# Patient Record
Sex: FEMALE | URBAN - METROPOLITAN AREA
[De-identification: names, ages, dates, MRNs, and addresses within clinical notes are randomized per-mention and may not be internally consistent; named-entity substitution may affect disease eponyms.]

---

## 2022-02-22 ENCOUNTER — APPOINTMENT (OUTPATIENT)
Age: 62
Setting detail: DERMATOLOGY
End: 2022-02-24

## 2022-04-25 ENCOUNTER — OFFICE VISIT (OUTPATIENT)
Dept: URBAN - METROPOLITAN AREA CLINIC 34 | Facility: CLINIC | Age: 62
End: 2022-04-25
Payer: MEDICAID

## 2022-04-25 DIAGNOSIS — H02.035 SENILE ENTROPION OF LEFT LOWER EYELID: ICD-10-CM

## 2022-04-25 PROCEDURE — 92285 EXTERNAL OCULAR PHOTOGRAPHY: CPT | Performed by: OPHTHALMOLOGY

## 2022-04-25 PROCEDURE — 99204 OFFICE O/P NEW MOD 45 MIN: CPT | Performed by: OPHTHALMOLOGY

## 2022-04-25 RX ORDER — NEOMYCIN SULFATE, POLYMYXIN B SULFATE AND DEXAMETHASONE 3.5; 10000; 1 MG/G; [USP'U]/G; MG/G
OINTMENT OPHTHALMIC
Qty: 1 | Refills: 1 | Status: ACTIVE
Start: 2022-04-25

## 2022-04-25 RX ORDER — ALLOPURINOL 100 MG/1
100 MG TABLET ORAL
Qty: 0 | Refills: 0 | Status: ACTIVE
Start: 2022-04-25

## 2022-04-25 RX ORDER — METHIMAZOLE 5 MG/1
5 MG TABLET ORAL
Qty: 0 | Refills: 0 | Status: INACTIVE
Start: 2022-04-25 | End: 2022-05-17

## 2022-04-25 RX ORDER — VALSARTAN 80 MG/1
80 MG TABLET, FILM COATED ORAL
Qty: 0 | Refills: 0 | Status: ACTIVE
Start: 2022-04-25

## 2022-04-25 RX ORDER — SAXAGLIPTIN 5 MG/1
5 MG TABLET, FILM COATED ORAL
Qty: 0 | Refills: 0 | Status: ACTIVE
Start: 2022-04-25

## 2022-04-25 NOTE — IMPRESSION/PLAN
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 Plan: The patient demonstrates burton tarsal entropion, with lashes abrading the cornea/globe, creating a mechanical keratoconjunctivitis. To resolve this, I recommended ATR < 10cm , along with a lateral tarsal strip to elevate/tighten/stabilize the lower lid, and prevent recurrence of entropion. The RBA of surgery were discussed and all questions were answered. Will do RLL first then LLL.

## 2022-04-25 NOTE — IMPRESSION/PLAN
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 Plan: The patient demonstrates burton tarsal entropion, with lashes abrading the cornea/globe, creating a mechanical keratoconjunctivitis. To resolve this, I recommended ATR < 10cm , along with a lateral tarsal strip to elevate/tighten/stabilize the lower lid, and prevent recurrence of entropion. The RBA of surgery were discussed and all questions were answered. Will do RLL first then LLL.

## 2022-05-10 ENCOUNTER — APPOINTMENT (OUTPATIENT)
Age: 62
Setting detail: DERMATOLOGY
End: 2022-05-11

## 2022-05-10 DIAGNOSIS — D22 MELANOCYTIC NEVI: ICD-10-CM

## 2022-05-10 DIAGNOSIS — L82.1 OTHER SEBORRHEIC KERATOSIS: ICD-10-CM

## 2022-05-10 DIAGNOSIS — L60.3 NAIL DYSTROPHY: ICD-10-CM

## 2022-05-10 PROBLEM — D22.39 MELANOCYTIC NEVI OF OTHER PARTS OF FACE: Status: ACTIVE | Noted: 2022-05-10

## 2022-05-10 PROCEDURE — 99203 OFFICE O/P NEW LOW 30 MIN: CPT

## 2022-05-10 PROCEDURE — OTHER TREATMENT REGIMEN: OTHER

## 2022-05-10 PROCEDURE — OTHER COUNSELING: OTHER

## 2022-05-10 ASSESSMENT — LOCATION SIMPLE DESCRIPTION DERM
LOCATION SIMPLE: LEFT CHEEK
LOCATION SIMPLE: NOSE
LOCATION SIMPLE: RIGHT GREAT TOE

## 2022-05-10 ASSESSMENT — LOCATION ZONE DERM
LOCATION ZONE: NOSE
LOCATION ZONE: TOENAIL
LOCATION ZONE: FACE

## 2022-05-10 ASSESSMENT — LOCATION DETAILED DESCRIPTION DERM
LOCATION DETAILED: RIGHT GREAT TOENAIL
LOCATION DETAILED: LEFT NASAL DORSUM
LOCATION DETAILED: LEFT CENTRAL MALAR CHEEK

## 2022-05-17 ENCOUNTER — ADULT PHYSICAL (OUTPATIENT)
Dept: URBAN - METROPOLITAN AREA CLINIC 24 | Facility: CLINIC | Age: 62
End: 2022-05-17
Payer: MEDICAID

## 2022-05-17 DIAGNOSIS — H02.032 SENILE ENTROPION OF RIGHT LOWER EYELID: ICD-10-CM

## 2022-05-17 DIAGNOSIS — Z01.818 ENCOUNTER FOR OTHER PREPROCEDURAL EXAMINATION: Primary | ICD-10-CM

## 2022-05-17 PROCEDURE — 99202 OFFICE O/P NEW SF 15 MIN: CPT | Performed by: PHYSICIAN ASSISTANT

## 2023-12-28 ENCOUNTER — OFFICE VISIT (OUTPATIENT)
Dept: URBAN - METROPOLITAN AREA CLINIC 10 | Facility: CLINIC | Age: 63
End: 2023-12-28
Payer: MEDICAID

## 2023-12-28 DIAGNOSIS — E11.9 TYPE 2 DIABETES MELLITUS W/O COMPLICATION: Primary | ICD-10-CM

## 2023-12-28 DIAGNOSIS — Z79.4 LONG TERM (CURRENT) USE OF INSULIN: ICD-10-CM

## 2023-12-28 DIAGNOSIS — H02.032 SENILE ENTROPION OF RIGHT LOWER EYELID: ICD-10-CM

## 2023-12-28 DIAGNOSIS — H02.035 SENILE ENTROPION OF LEFT LOWER EYELID: ICD-10-CM

## 2023-12-28 PROCEDURE — 99204 OFFICE O/P NEW MOD 45 MIN: CPT | Performed by: OPTOMETRIST

## 2023-12-28 RX ORDER — NEOMYCIN SULFATE, POLYMYXIN B SULFATE AND DEXAMETHASONE 3.5; 10000; 1 MG/G; [USP'U]/G; MG/G
OINTMENT OPHTHALMIC
Qty: 1 | Refills: 1 | Status: ACTIVE
Start: 2023-12-28

## 2023-12-28 ASSESSMENT — INTRAOCULAR PRESSURE
OD: 12
OS: 14

## 2023-12-28 ASSESSMENT — VISUAL ACUITY
OS: UNABLE
OD: UNABLE

## 2025-05-16 ENCOUNTER — OFFICE VISIT (OUTPATIENT)
Dept: URBAN - METROPOLITAN AREA CLINIC 10 | Facility: CLINIC | Age: 65
End: 2025-05-16
Payer: MEDICAID

## 2025-05-16 DIAGNOSIS — H25.13 AGE-RELATED NUCLEAR CATARACT, BILATERAL: ICD-10-CM

## 2025-05-16 DIAGNOSIS — H02.032 SENILE ENTROPION OF RIGHT LOWER EYELID: ICD-10-CM

## 2025-05-16 DIAGNOSIS — Z79.4 LONG TERM (CURRENT) USE OF INSULIN: ICD-10-CM

## 2025-05-16 DIAGNOSIS — E11.9 TYPE 2 DIABETES MELLITUS W/O COMPLICATION: Primary | ICD-10-CM

## 2025-05-16 DIAGNOSIS — H04.123 DRY EYE SYNDROME OF BILATERAL LACRIMAL GLANDS: ICD-10-CM

## 2025-05-16 DIAGNOSIS — H02.035 SENILE ENTROPION OF LEFT LOWER EYELID: ICD-10-CM

## 2025-05-16 PROCEDURE — 92014 COMPRE OPH EXAM EST PT 1/>: CPT | Performed by: OPTOMETRIST

## 2025-05-16 RX ORDER — SIMVASTATIN 80 MG/1
80 MG TABLET, FILM COATED ORAL
Qty: 0 | Refills: 0 | Status: ACTIVE
Start: 2025-05-16